# Patient Record
Sex: FEMALE | Race: WHITE | NOT HISPANIC OR LATINO | Employment: FULL TIME | URBAN - METROPOLITAN AREA
[De-identification: names, ages, dates, MRNs, and addresses within clinical notes are randomized per-mention and may not be internally consistent; named-entity substitution may affect disease eponyms.]

---

## 2024-04-23 ENCOUNTER — TELEPHONE (OUTPATIENT)
Dept: OBSTETRICS AND GYNECOLOGY | Facility: CLINIC | Age: 54
End: 2024-04-23

## 2024-04-23 NOTE — TELEPHONE ENCOUNTER
Caller: Madhavi Perdomo    Relationship to patient: Self    Best call back number: [phone]    Chief complaint: LUMPS IN BREAST AND HEAVY BLEEDING    Type of visit: NEW GYN (FORMER PT OF DR. MALIN)    Requested date:  SHE WILL BE BACK 4-18 EVENING    If rescheduling, when is the original appointment:  04-25    Additional notes: SHE WAS SCHEDULED FROM A REFERRAL